# Patient Record
(demographics unavailable — no encounter records)

---

## 2025-02-20 NOTE — HEALTH RISK ASSESSMENT
[Good] : ~his/her~  mood as  good [No] : In the past 12 months have you used drugs other than those required for medical reasons? No [0] : 2) Feeling down, depressed, or hopeless: Not at all (0) [PHQ-2 Negative - No further assessment needed] : PHQ-2 Negative - No further assessment needed [Former] : Former [20 or more] : 20 or more [< 15 Years] : < 15 Years [NO] : No [Patient reported colonoscopy was normal] : Patient reported colonoscopy was normal [None] : None [With Family] : lives with family [Employed] : employed [] :  [# Of Children ___] : has [unfilled] children [Audit-CScore] : 0 [IIO2Erkcx] : 0 [ColonoscopyComments] : Due

## 2025-02-20 NOTE — PLAN
[FreeTextEntry1] : Routine blood work drawn in office and urine collected today. Check ECG today. Renew meds. Pt advised to sign up for [facility] portal to review labs and communicate any questions or concerns directly. Yearly physical and return as needed for illness, medication refills, and new or existing complaints.

## 2025-02-20 NOTE — HISTORY OF PRESENT ILLNESS
[de-identified] : 51 year old M with PMH HTN, HLD, and former smoker presents for establish care and CPE. Pt denies CP/SOB, fever/chills, n/v/d/c.

## 2025-04-02 NOTE — HISTORY OF PRESENT ILLNESS
[FreeTextEntry1] : 51M w HTN HLD presents for f/u PMD: Dr Díaz  Previously, pt last seen 10/23 for an initial eval. feeling well, concerned with cv health given multiple risk factors. tte grossly unremarkable. pt with prev calcium score in 2019 was 110  pt now seen since 10/23 pt now presents for follow up today,  pt overall feeling well. denies CP, SOB, at rest or on exertion. Denies palpitations, dizziness, diaphoresis, syncope, LE edema, orthopnea  Exercise: regularly, weights and cardio, exercise bike, no issues Diet: none   Prev cardiac history: none Previous cardiac testing: see above Recent labs:  EKG: SR 58  Med hx: HLD HTN Sx hx: hernia   Family hx: F: CAD/CABG Social hx: from [facility], lives in [place] with wife (dx with breast CA 2020). and three kids. works for [employer]. former tob (quit completely 3 years ago). no etoh/drugs Meds: norvasc 5 crestor 5 Allergies: nkda

## 2025-04-02 NOTE — DISCUSSION/SUMMARY
[FreeTextEntry1] : 51M w HTN HLD presents for f/u  Feeling well Euvolemic EKG showing SR No cp or sob remains very concerned about underlying cv dz given risk factors profile will check calcium score for further risk stratification   HTN -cont norvsasc  HLD -cont crestor  f/u after testing 40 min spent on complete encounter  [EKG obtained to assist in diagnosis and management of assessed problem(s)] : EKG obtained to assist in diagnosis and management of assessed problem(s)

## 2025-06-25 NOTE — HISTORY OF PRESENT ILLNESS
[FreeTextEntry8] : This is a 51-year-old male with history of hypertension presents today with COVID infection  pt repots symptoms of headache and fatigue and congestion that started 3 days ago. He also reports his daughter has similar symptoms. He denies shortness of breath, chest pain, difficulty with swallowing, change in taste or smell.  Patient tested positive on home COVID test yesterday.

## 2025-06-25 NOTE — DATA REVIEWED
[FreeTextEntry1] : mild symptoms, encouraged symptomatic treatment with decongestion and Tylenol.   recommend isolation precaution  recommend high tea  counselled to go to emergency room if pt starts to have SOB or severely worsening symptoms